# Patient Record
Sex: MALE | Race: BLACK OR AFRICAN AMERICAN | NOT HISPANIC OR LATINO | URBAN - METROPOLITAN AREA
[De-identification: names, ages, dates, MRNs, and addresses within clinical notes are randomized per-mention and may not be internally consistent; named-entity substitution may affect disease eponyms.]

---

## 2020-07-25 ENCOUNTER — TELEPHONE ENCOUNTER (OUTPATIENT)
Dept: URBAN - METROPOLITAN AREA CLINIC 13 | Facility: CLINIC | Age: 50
End: 2020-07-25

## 2020-07-25 RX ORDER — CHLORHEXIDINE GLUCONATE 1.2 MG/ML
RINSE MOUTH WITH 15ML (1 CAPFUL) FOR 30 SECONDS AM AND PM AFTER TOOTHBRUSHING. EXPECTORATE AFTER RINSING, DO NOT SWALLOW RINSE ORAL
Refills: 0 | OUTPATIENT
End: 2016-09-13

## 2020-07-25 RX ORDER — AMOXICILLIN 250 MG/1
TAKE 1 CAPSULE 3 TIMES DAILY CAPSULE ORAL
Refills: 0 | OUTPATIENT
End: 2016-05-17

## 2020-07-25 RX ORDER — HYOSCYAMINE SULFATE 0.12 MG/1
TAKE 1 TABLET EVERY 6 HOURS AS NEEDED TABLET ORAL
Qty: 120 | Refills: 2 | OUTPATIENT
Start: 2017-05-31 | End: 2019-01-21

## 2020-07-25 RX ORDER — MELOXICAM 7.5 MG/1
TAKE 1 TABLET DAILY TABLET ORAL
Refills: 0 | OUTPATIENT
End: 2016-09-13

## 2020-07-25 RX ORDER — ESOMEPRAZOLE MAGNESIUM 40 MG/1
TAKE 1 CAPSULE TWICE DAILY 30 MINUTES PRIOR TO BREAKFAST AND DINNER CAPSULE, DELAYED RELEASE PELLETS ORAL
Qty: 60 | Refills: 11 | OUTPATIENT
Start: 2016-03-23 | End: 2017-09-18

## 2020-07-25 RX ORDER — GABAPENTIN 600 MG/1
TAKE 1 TABLET DAILY TABLET, FILM COATED ORAL
Refills: 0 | OUTPATIENT
End: 2017-09-18

## 2020-07-25 RX ORDER — POLYETHYLENE GLYCOL 3350, SODIUM CHLORIDE, SODIUM BICARBONATE AND POTASSIUM CHLORIDE WITH LEMON FLAVOR 420; 11.2; 5.72; 1.48 G/4L; G/4L; G/4L; G/4L
USE AS DIRECTED POWDER, FOR SOLUTION ORAL
Qty: 1 | Refills: 0 | OUTPATIENT
Start: 2016-08-16 | End: 2016-09-13

## 2020-07-25 RX ORDER — IBUPROFEN 800 MG/1
TAKE 1 TABLET 3 TIMES DAILY AS NEEDED TABLET ORAL
Refills: 0 | OUTPATIENT
End: 2016-04-21

## 2020-07-26 ENCOUNTER — TELEPHONE ENCOUNTER (OUTPATIENT)
Dept: URBAN - METROPOLITAN AREA CLINIC 13 | Facility: CLINIC | Age: 50
End: 2020-07-26

## 2020-07-26 RX ORDER — AMITRIPTYLINE HYDROCHLORIDE 25 MG/1
TABLET, FILM COATED ORAL
Qty: 30 | Refills: 0 | Status: ACTIVE | COMMUNITY
Start: 2018-09-12

## 2020-07-26 RX ORDER — LATANOPROST/PF 0.005 %
DROPS OPHTHALMIC (EYE)
Qty: 7 | Refills: 0 | Status: ACTIVE | COMMUNITY
Start: 2018-12-10

## 2020-07-26 RX ORDER — WHEAT DEXTRIN 3 G/4 G
TAKE 1 TBSP DAILY POWDER (GRAM) ORAL
Qty: 1 | Refills: 11 | Status: ACTIVE | COMMUNITY
Start: 2019-01-21

## 2020-07-26 RX ORDER — RIZATRIPTAN BENZOATE 10 MG/1
TABLET, ORALLY DISINTEGRATING ORAL
Qty: 9 | Refills: 0 | Status: ACTIVE | COMMUNITY
Start: 2018-12-28

## 2020-07-26 RX ORDER — AMLODIPINE BESYLATE 5 MG/1
TAKE 1 TABLET DAILY TABLET ORAL
Refills: 0 | Status: ACTIVE | COMMUNITY

## 2020-07-26 RX ORDER — SILDENAFIL CITRATE 100 MG/1
TABLET, FILM COATED ORAL
Qty: 3 | Refills: 0 | Status: ACTIVE | COMMUNITY
Start: 2018-02-13

## 2020-07-26 RX ORDER — SERTRALINE 50 MG/1
TABLET, FILM COATED ORAL
Qty: 30 | Refills: 0 | Status: ACTIVE | COMMUNITY
Start: 2018-01-30

## 2020-07-26 RX ORDER — HYDROCODONE BITARTRATE AND ACETAMINOPHEN 5; 325 MG/1; MG/1
TABLET ORAL
Qty: 10 | Refills: 0 | Status: ACTIVE | COMMUNITY
Start: 2018-07-18

## 2020-07-26 RX ORDER — IBUPROFEN 600 MG/1
TABLET ORAL
Qty: 30 | Refills: 0 | Status: ACTIVE | COMMUNITY
Start: 2018-07-18

## 2020-07-26 RX ORDER — CITALOPRAM HYDROBROMIDE 10 MG
TAKE 1 TABLET DAILY TABLET ORAL
Refills: 1 | Status: ACTIVE | COMMUNITY

## 2020-07-26 RX ORDER — MONTELUKAST SODIUM 10 MG/1
TAKE 1 TABLET DAILY TABLET, FILM COATED ORAL
Refills: 0 | Status: ACTIVE | COMMUNITY

## 2020-07-26 RX ORDER — DEXTROSE 4 G
TAKE 1 TABLET DAILY TABLET,CHEWABLE ORAL
Refills: 0 | Status: ACTIVE | COMMUNITY

## 2020-07-26 RX ORDER — PROMETHAZINE HYDROCHLORIDE 25 MG/1
TABLET ORAL
Qty: 10 | Refills: 0 | Status: ACTIVE | COMMUNITY
Start: 2018-07-18

## 2020-07-26 RX ORDER — CARBOXYMETHYLCELLULOSE SODIUM 0.5 G/100ML
SOLUTION/ DROPS OPHTHALMIC
Qty: 30 | Refills: 0 | Status: ACTIVE | COMMUNITY
Start: 2019-04-15

## 2020-07-26 RX ORDER — FLUTICASONE PROPIONATE 50 UG/1
USE 1 SPRAY IN EACH NOSTRIL TWICE DAILY SPRAY, METERED NASAL
Refills: 0 | Status: ACTIVE | COMMUNITY

## 2020-07-26 RX ORDER — TRAZODONE HYDROCHLORIDE 50 MG/1
TAKE 1 TABLET AT BEDTIME AS NEEDED FOR SLEEP TABLET ORAL
Refills: 0 | Status: ACTIVE | COMMUNITY
Start: 2018-01-30

## 2020-07-26 RX ORDER — PANTOPRAZOLE SODIUM 40 MG/1
TAKE 1 TABLET TWICE DAILY BEFORE MEALS TABLET, DELAYED RELEASE ORAL
Qty: 90 | Refills: 0 | Status: ACTIVE | COMMUNITY
Start: 2017-07-22

## 2020-07-26 RX ORDER — GABAPENTIN 300 MG/1
TAKE 1 CAPSULE DAILY CAPSULE ORAL
Refills: 0 | Status: ACTIVE | COMMUNITY
Start: 2018-09-10

## 2020-09-20 ENCOUNTER — ERX REFILL RESPONSE (OUTPATIENT)
Age: 50
End: 2020-09-20

## 2020-09-20 RX ORDER — PANTOPRAZOLE 40 MG/1
TAKE 1 TABLET TWICE A DAY BEFORE MEALS TABLET, DELAYED RELEASE ORAL
Qty: 90 | Refills: 7

## 2021-10-10 ENCOUNTER — ERX REFILL RESPONSE (OUTPATIENT)
Dept: URBAN - METROPOLITAN AREA CLINIC 107 | Facility: CLINIC | Age: 51
End: 2021-10-10

## 2021-10-10 RX ORDER — PANTOPRAZOLE SODIUM 40 MG/1
TAKE 1 TABLET TWICE A DAY BEFORE MEALS TABLET, DELAYED RELEASE ORAL
Qty: 90 TABLET | Refills: 8 | OUTPATIENT

## 2021-10-10 RX ORDER — PANTOPRAZOLE 40 MG/1
TAKE 1 TABLET TWICE A DAY BEFORE MEALS TABLET, DELAYED RELEASE ORAL
Qty: 90 | Refills: 7 | OUTPATIENT

## 2023-02-23 ENCOUNTER — OFFICE VISIT (OUTPATIENT)
Dept: URBAN - METROPOLITAN AREA CLINIC 107 | Facility: CLINIC | Age: 53
End: 2023-02-23

## 2023-02-23 RX ORDER — PANTOPRAZOLE SODIUM 40 MG/1
TAKE 1 TABLET TWICE A DAY BEFORE MEALS TABLET, DELAYED RELEASE ORAL
Qty: 90 TABLET | Refills: 8 | Status: ACTIVE | COMMUNITY

## 2023-02-23 RX ORDER — LATANOPROST/PF 0.005 %
DROPS OPHTHALMIC (EYE)
Qty: 7 | Refills: 0 | Status: ACTIVE | COMMUNITY
Start: 2018-12-10

## 2023-02-23 RX ORDER — CARBOXYMETHYLCELLULOSE SODIUM 0.5 G/100ML
SOLUTION/ DROPS OPHTHALMIC
Qty: 30 | Refills: 0 | Status: ACTIVE | COMMUNITY
Start: 2019-04-15

## 2023-02-23 RX ORDER — MONTELUKAST SODIUM 10 MG/1
TAKE 1 TABLET DAILY TABLET, FILM COATED ORAL
Refills: 0 | Status: ACTIVE | COMMUNITY

## 2023-02-23 RX ORDER — IBUPROFEN 600 MG/1
TABLET ORAL
Qty: 30 | Refills: 0 | Status: ACTIVE | COMMUNITY
Start: 2018-07-18

## 2023-02-23 RX ORDER — FLUTICASONE PROPIONATE 50 UG/1
USE 1 SPRAY IN EACH NOSTRIL TWICE DAILY SPRAY, METERED NASAL
Refills: 0 | Status: ACTIVE | COMMUNITY

## 2023-02-23 RX ORDER — AMITRIPTYLINE HYDROCHLORIDE 25 MG/1
TABLET, FILM COATED ORAL
Qty: 30 | Refills: 0 | Status: ACTIVE | COMMUNITY
Start: 2018-09-12

## 2023-02-23 RX ORDER — DEXTROSE 4 G
TAKE 1 TABLET DAILY TABLET,CHEWABLE ORAL
Refills: 0 | Status: ACTIVE | COMMUNITY

## 2023-02-23 RX ORDER — SILDENAFIL CITRATE 100 MG/1
TABLET, FILM COATED ORAL
Qty: 3 | Refills: 0 | Status: ACTIVE | COMMUNITY
Start: 2018-02-13

## 2023-02-23 RX ORDER — WHEAT DEXTRIN 3 G/4 G
TAKE 1 TBSP DAILY POWDER (GRAM) ORAL
Qty: 1 | Refills: 11 | Status: ACTIVE | COMMUNITY
Start: 2019-01-21

## 2023-02-23 RX ORDER — SERTRALINE 50 MG/1
TABLET, FILM COATED ORAL
Qty: 30 | Refills: 0 | Status: ACTIVE | COMMUNITY
Start: 2018-01-30

## 2023-02-23 RX ORDER — CITALOPRAM HYDROBROMIDE 10 MG
TAKE 1 TABLET DAILY TABLET ORAL
Refills: 1 | Status: ACTIVE | COMMUNITY

## 2023-02-23 RX ORDER — GABAPENTIN 300 MG/1
TAKE 1 CAPSULE DAILY CAPSULE ORAL
Refills: 0 | Status: ACTIVE | COMMUNITY
Start: 2018-09-10

## 2023-02-23 RX ORDER — HYDROCODONE BITARTRATE AND ACETAMINOPHEN 5; 325 MG/1; MG/1
TABLET ORAL
Qty: 10 | Refills: 0 | Status: ACTIVE | COMMUNITY
Start: 2018-07-18

## 2023-02-23 RX ORDER — TRAZODONE HYDROCHLORIDE 50 MG/1
TAKE 1 TABLET AT BEDTIME AS NEEDED FOR SLEEP TABLET ORAL
Refills: 0 | Status: ACTIVE | COMMUNITY
Start: 2018-01-30

## 2023-02-23 RX ORDER — RIZATRIPTAN BENZOATE 10 MG/1
TABLET, ORALLY DISINTEGRATING ORAL
Qty: 9 | Refills: 0 | Status: ACTIVE | COMMUNITY
Start: 2018-12-28

## 2023-02-23 RX ORDER — PROMETHAZINE HYDROCHLORIDE 25 MG/1
TABLET ORAL
Qty: 10 | Refills: 0 | Status: ACTIVE | COMMUNITY
Start: 2018-07-18

## 2023-02-23 RX ORDER — AMLODIPINE BESYLATE 5 MG/1
TAKE 1 TABLET DAILY TABLET ORAL
Refills: 0 | Status: ACTIVE | COMMUNITY

## 2023-02-23 NOTE — HPI-TODAY'S VISIT:
Mr. Lundberg is a 52-year-old gentleman presenting for foul-smelling belching and stomach pain. Last seen in 2019 for history of GERD that was well controlled on pantoprazole 40 mg twice daily.  He was taking Benefiber for chronic diarrhea.  He was due for colonoscopy in 2021 for history of colon polyps. 9/13/2016 colonoscopy:Removal of three 5 mm polyps in the rectum and ascending colon, diverticulosis of the sigmoid colon, nonbleeding internal hemorrhoids.  Pathology revealed tubular adenoma and hyperplastic polyp.  Repeat colonoscopy recommended in 5 years.

## 2023-02-28 ENCOUNTER — WEB ENCOUNTER (OUTPATIENT)
Dept: URBAN - METROPOLITAN AREA CLINIC 107 | Facility: CLINIC | Age: 53
End: 2023-02-28

## 2023-03-02 ENCOUNTER — DASHBOARD ENCOUNTERS (OUTPATIENT)
Age: 53
End: 2023-03-02

## 2023-03-02 ENCOUNTER — LAB OUTSIDE AN ENCOUNTER (OUTPATIENT)
Dept: URBAN - METROPOLITAN AREA CLINIC 107 | Facility: CLINIC | Age: 53
End: 2023-03-02

## 2023-03-02 ENCOUNTER — OFFICE VISIT (OUTPATIENT)
Dept: URBAN - METROPOLITAN AREA CLINIC 107 | Facility: CLINIC | Age: 53
End: 2023-03-02
Payer: OTHER GOVERNMENT

## 2023-03-02 VITALS
HEART RATE: 99 BPM | RESPIRATION RATE: 18 BRPM | SYSTOLIC BLOOD PRESSURE: 93 MMHG | HEIGHT: 71 IN | TEMPERATURE: 98.2 F | DIASTOLIC BLOOD PRESSURE: 69 MMHG | WEIGHT: 289 LBS | BODY MASS INDEX: 40.46 KG/M2

## 2023-03-02 DIAGNOSIS — K58.0 IRRITABLE BOWEL SYNDROME WITH DIARRHEA: ICD-10-CM

## 2023-03-02 DIAGNOSIS — K63.89 SMALL INTESTINAL BACTERIAL OVERGROWTH (SIBO): ICD-10-CM

## 2023-03-02 DIAGNOSIS — Z86.010 HISTORY OF ADENOMATOUS POLYP OF COLON: ICD-10-CM

## 2023-03-02 PROBLEM — 197125005: Status: ACTIVE | Noted: 2023-03-02

## 2023-03-02 PROCEDURE — 99213 OFFICE O/P EST LOW 20 MIN: CPT

## 2023-03-02 RX ORDER — CITALOPRAM HYDROBROMIDE 10 MG
TAKE 1 TABLET DAILY TABLET ORAL
Refills: 1 | Status: ACTIVE | COMMUNITY

## 2023-03-02 RX ORDER — AMLODIPINE BESYLATE 5 MG/1
TAKE 1 TABLET DAILY TABLET ORAL
Refills: 0 | Status: ACTIVE | COMMUNITY

## 2023-03-02 RX ORDER — METFORMIN HYDROCHLORIDE 1000 MG/1
1 TABLET WITH A MEAL TABLET, FILM COATED ORAL ONCE A DAY
Status: ACTIVE | COMMUNITY

## 2023-03-02 RX ORDER — SERTRALINE 50 MG/1
TABLET, FILM COATED ORAL
Qty: 30 | Refills: 0 | Status: ACTIVE | COMMUNITY
Start: 2018-01-30

## 2023-03-02 RX ORDER — RIZATRIPTAN BENZOATE 10 MG/1
TABLET, ORALLY DISINTEGRATING ORAL
Qty: 9 | Refills: 0 | Status: ACTIVE | COMMUNITY
Start: 2018-12-28

## 2023-03-02 RX ORDER — DULAGLUTIDE 3 MG/.5ML
AS DIRECTED INJECTION, SOLUTION SUBCUTANEOUS
Status: ACTIVE | COMMUNITY

## 2023-03-02 RX ORDER — CARBOXYMETHYLCELLULOSE SODIUM 0.5 G/100ML
SOLUTION/ DROPS OPHTHALMIC
Qty: 30 | Refills: 0 | Status: ACTIVE | COMMUNITY
Start: 2019-04-15

## 2023-03-02 RX ORDER — RIFAXIMIN 550 MG/1
1 TABLET TABLET ORAL THREE TIMES A DAY
Qty: 42 TABLET | Refills: 0 | OUTPATIENT
Start: 2023-03-02 | End: 2023-03-16

## 2023-03-02 RX ORDER — SILDENAFIL CITRATE 100 MG/1
TABLET, FILM COATED ORAL
Qty: 3 | Refills: 0 | Status: ACTIVE | COMMUNITY
Start: 2018-02-13

## 2023-03-02 RX ORDER — SODIUM, POTASSIUM,MAG SULFATES 17.5-3.13G
177ML SOLUTION, RECONSTITUTED, ORAL ORAL
Qty: 1 | OUTPATIENT
Start: 2023-03-02 | End: 2023-03-04

## 2023-03-02 RX ORDER — MONTELUKAST SODIUM 10 MG/1
TAKE 1 TABLET DAILY TABLET, FILM COATED ORAL
Refills: 0 | Status: ACTIVE | COMMUNITY

## 2023-03-02 RX ORDER — DEXTROSE 4 G
TAKE 1 TABLET DAILY TABLET,CHEWABLE ORAL
Refills: 0 | Status: ACTIVE | COMMUNITY

## 2023-03-02 RX ORDER — PANTOPRAZOLE SODIUM 40 MG/1
TAKE 1 TABLET TWICE A DAY BEFORE MEALS TABLET, DELAYED RELEASE ORAL
Qty: 90 TABLET | Refills: 8 | Status: ACTIVE | COMMUNITY

## 2023-03-02 RX ORDER — WHEAT DEXTRIN 3 G/4 G
TAKE 1 TBSP DAILY POWDER (GRAM) ORAL
Qty: 1 | Refills: 11 | Status: ACTIVE | COMMUNITY
Start: 2019-01-21

## 2023-03-02 RX ORDER — FLUTICASONE PROPIONATE 50 UG/1
USE 1 SPRAY IN EACH NOSTRIL TWICE DAILY SPRAY, METERED NASAL
Refills: 0 | Status: ACTIVE | COMMUNITY

## 2023-03-02 RX ORDER — GABAPENTIN 300 MG/1
TAKE 1 CAPSULE DAILY CAPSULE ORAL
Refills: 0 | Status: ACTIVE | COMMUNITY
Start: 2018-09-10

## 2023-03-02 RX ORDER — LATANOPROST/PF 0.005 %
DROPS OPHTHALMIC (EYE)
Qty: 7 | Refills: 0 | Status: ACTIVE | COMMUNITY
Start: 2018-12-10

## 2023-03-02 RX ORDER — TRAZODONE HYDROCHLORIDE 50 MG/1
TAKE 1 TABLET AT BEDTIME AS NEEDED FOR SLEEP TABLET ORAL
Refills: 0 | Status: ACTIVE | COMMUNITY
Start: 2018-01-30

## 2023-03-02 NOTE — HPI-TODAY'S VISIT:
Mr. Lundberg is a 52-year-old gentleman presenting for bad smelling burps and stomach pain.  Last seen in 2019 for GERD symptoms that were well controlled with pantoprazole 40 mg twice daily, chronic diarrhea improved with Benefiber, history of colon polyps due for colonoscopy in 2021.  9/13/2016 colonoscopy: Three 5 mm polyps removed from the rectum, ascending colon.  Sigmoid diverticulosis.  Nonbleeding internal hemorrhoids.  Pathology revealed tubular adenomas and hyperplastic polyps.  Repeat colonoscopy in 5 years. Patient presents to discuss ongoing abdominal symptoms.  He reports back in August 2022 he experienced an episode of belching that smelled like rotten eggs and sulfur.  The episode has made him extremely nauseous.  He will experience abdominal pain and diarrhea.  These episodes have been intermittent since then.  He will experience an episode that will last about 3 to 4 days and then will be gone for about a week or so and then come back again.  He is taking pantoprazole 40 mg twice daily, which helps him on a regular basis, but during these episodes it does not help anymore.  He feels like his food is not digesting and there is rotten food in his stomach that is causing these episodes.  His abdominal pain is mostly in the epigastric and right lower quadrant.  He reports that his sugars have been running all right (about 110).  His bowel movements are regular between these episodes having 1-2 formed bowel movements per day, but during these episodes he will have a bowel movement 9-10 times a day of loose watery stool.

## 2023-03-03 PROBLEM — 429047008: Status: ACTIVE | Noted: 2023-03-02

## 2023-03-09 ENCOUNTER — TELEPHONE ENCOUNTER (OUTPATIENT)
Dept: URBAN - METROPOLITAN AREA CLINIC 113 | Facility: CLINIC | Age: 53
End: 2023-03-09

## 2023-03-15 ENCOUNTER — OFFICE VISIT (OUTPATIENT)
Dept: URBAN - METROPOLITAN AREA SURGERY CENTER 25 | Facility: SURGERY CENTER | Age: 53
End: 2023-03-15
Payer: OTHER GOVERNMENT

## 2023-03-15 DIAGNOSIS — Z80.0 FAMILY HISTORY OF COLON CANCER FATHER: ICD-10-CM

## 2023-03-15 DIAGNOSIS — Z86.010 HISTORY OF ADENOMATOUS POLYP OF COLON: ICD-10-CM

## 2023-03-15 PROCEDURE — 45378 DIAGNOSTIC COLONOSCOPY: CPT | Performed by: INTERNAL MEDICINE

## 2023-03-15 PROCEDURE — G8907 PT DOC NO EVENTS ON DISCHARG: HCPCS | Performed by: INTERNAL MEDICINE

## 2023-03-15 RX ORDER — MONTELUKAST SODIUM 10 MG/1
TAKE 1 TABLET DAILY TABLET, FILM COATED ORAL
Refills: 0 | Status: ACTIVE | COMMUNITY

## 2023-03-15 RX ORDER — PANTOPRAZOLE SODIUM 40 MG/1
TAKE 1 TABLET TWICE A DAY BEFORE MEALS TABLET, DELAYED RELEASE ORAL
Qty: 90 TABLET | Refills: 8 | Status: ACTIVE | COMMUNITY

## 2023-03-15 RX ORDER — GABAPENTIN 300 MG/1
TAKE 1 CAPSULE DAILY CAPSULE ORAL
Refills: 0 | Status: ACTIVE | COMMUNITY
Start: 2018-09-10

## 2023-03-15 RX ORDER — CARBOXYMETHYLCELLULOSE SODIUM 0.5 G/100ML
SOLUTION/ DROPS OPHTHALMIC
Qty: 30 | Refills: 0 | Status: ACTIVE | COMMUNITY
Start: 2019-04-15

## 2023-03-15 RX ORDER — RIFAXIMIN 550 MG/1
1 TABLET TABLET ORAL THREE TIMES A DAY
Qty: 42 TABLET | Refills: 0 | Status: ACTIVE | COMMUNITY
Start: 2023-03-02 | End: 2023-03-16

## 2023-03-15 RX ORDER — DEXTROSE 4 G
TAKE 1 TABLET DAILY TABLET,CHEWABLE ORAL
Refills: 0 | Status: ACTIVE | COMMUNITY

## 2023-03-15 RX ORDER — METFORMIN HYDROCHLORIDE 1000 MG/1
1 TABLET WITH A MEAL TABLET, FILM COATED ORAL ONCE A DAY
Status: ACTIVE | COMMUNITY

## 2023-03-15 RX ORDER — FLUTICASONE PROPIONATE 50 UG/1
USE 1 SPRAY IN EACH NOSTRIL TWICE DAILY SPRAY, METERED NASAL
Refills: 0 | Status: ACTIVE | COMMUNITY

## 2023-03-15 RX ORDER — CITALOPRAM HYDROBROMIDE 10 MG
TAKE 1 TABLET DAILY TABLET ORAL
Refills: 1 | Status: ACTIVE | COMMUNITY

## 2023-03-15 RX ORDER — AMLODIPINE BESYLATE 5 MG/1
TAKE 1 TABLET DAILY TABLET ORAL
Refills: 0 | Status: ACTIVE | COMMUNITY

## 2023-03-15 RX ORDER — SERTRALINE 50 MG/1
TABLET, FILM COATED ORAL
Qty: 30 | Refills: 0 | Status: ACTIVE | COMMUNITY
Start: 2018-01-30

## 2023-03-15 RX ORDER — DULAGLUTIDE 3 MG/.5ML
AS DIRECTED INJECTION, SOLUTION SUBCUTANEOUS
Status: ACTIVE | COMMUNITY

## 2023-03-15 RX ORDER — SILDENAFIL CITRATE 100 MG/1
TABLET, FILM COATED ORAL
Qty: 3 | Refills: 0 | Status: ACTIVE | COMMUNITY
Start: 2018-02-13

## 2023-03-15 RX ORDER — LATANOPROST/PF 0.005 %
DROPS OPHTHALMIC (EYE)
Qty: 7 | Refills: 0 | Status: ACTIVE | COMMUNITY
Start: 2018-12-10

## 2023-03-15 RX ORDER — RIZATRIPTAN BENZOATE 10 MG/1
TABLET, ORALLY DISINTEGRATING ORAL
Qty: 9 | Refills: 0 | Status: ACTIVE | COMMUNITY
Start: 2018-12-28

## 2023-03-15 RX ORDER — WHEAT DEXTRIN 3 G/4 G
TAKE 1 TBSP DAILY POWDER (GRAM) ORAL
Qty: 1 | Refills: 11 | Status: ACTIVE | COMMUNITY
Start: 2019-01-21

## 2023-03-15 RX ORDER — TRAZODONE HYDROCHLORIDE 50 MG/1
TAKE 1 TABLET AT BEDTIME AS NEEDED FOR SLEEP TABLET ORAL
Refills: 0 | Status: ACTIVE | COMMUNITY
Start: 2018-01-30